# Patient Record
(demographics unavailable — no encounter records)

---

## 2025-01-08 NOTE — HISTORY OF PRESENT ILLNESS
[Patient reported mammogram was normal] : Patient reported mammogram was normal [Patient reported colonoscopy was normal] : Patient reported colonoscopy was normal [Y] : Positive pregnancy history [___] : #2 ([unfilled]): [Pregnancy History] : boy [Previously active] : previously active [Men] : men [FreeTextEntry1] : 66yo  postmenopausal female presents c/o pelvic pressure over the past few years, she denies any vaginal bleeding. The pain is non radiating, intermittent and mild and more of a crampy feeling she says. She has had no change in appetite nor any bloating or change in bowel habits. [Mammogramdate] : 6/2024 [PapSmeardate] : 1/7/2025 [ColonoscopyDate] : 10/2024 [PGHxTotal] : 2

## 2025-01-08 NOTE — PHYSICAL EXAM
[No Discharge] : no discharge [No Masses] : no breast masses were palpable [Labia Majora] : normal [Labia Minora] : normal [Atrophy] : atrophy [Normal] : normal [Uterine Adnexae] : non-palpable [Tenderness] : nontender

## 2025-04-22 NOTE — PLAN
[FreeTextEntry1] : Her benign results were reviewed along with OR pictures.  Though she never did experience any bleeding she was advised that if she does to let me know right away as no one passed can rule out cancer to 100%.

## 2025-05-06 NOTE — HISTORY OF PRESENT ILLNESS
[FreeTextEntry1] : Ms. ANDRE LIMA is a 68 year female who is seen in the office for diabetic foot care, onychomycosis, calluses, hammertoes. Patient denies any current or recent fever, chills, nausea, vomiting, SOB, or chest pain. AAOx3 and in NAD.

## 2025-05-06 NOTE — REASON FOR VISIT
[Initial Visit] : an initial visit for [FreeTextEntry2] : diabetic foot care, onychomycosis, calluses, hammertoes

## 2025-05-06 NOTE — PHYSICAL EXAM
[General Appearance - Alert] : alert [General Appearance - In No Acute Distress] : in no acute distress [Delayed in the Right Toes] : capillary refills normal in right toes [Delayed in the Left Toes] : capillary refills normal in the left toes [2+] : left foot dorsalis pedis 2+ [] : normal strength/tone [de-identified] : ROM of ankle, subtalar, midtarsal, MPJ, IPJ are adequate and nontender bilateral 5/5 muscle power in inversion, eversion, dorsiflexion, plantarflexion bilateral [Skin Turgor] : normal skin turgor [Foot Ulcer] : no foot ulcer [FreeTextEntry1] : gross epicritic sensation to feet diminished, light touch sensation diminished, sharp/dull sensation intact [Oriented To Time, Place, And Person] : oriented to person, place, and time [Impaired Insight] : insight and judgment were intact [Affect] : the affect was normal

## 2025-05-06 NOTE — ASSESSMENT
[FreeTextEntry1] : 1) Onychomycosis with elongated toenails with pain/discomfort: aseptic debridement of elongated mycotic toenails x 6, Rx tavaborole topical solution daily 2) Callus: aseptic debridement and paring of painful foot callus x 4, Rx urea 40% cream 3) Diabetes: continue efforts at glycemic control, check feet daily for any new lesions, use of proper fit shoes with accommodative insoles 4) Hammertoes: recommend use of proper fit high toe box shoes with accommodative insoles, check feet for any new lesions daily, seek treatment immediately if present, applied gel toe cap to protect hammertoe from untoward friction and pressure and to prevent ulcer formation, discussed surgical correction as well  Patient tolerated all treatments well and without any complications Follow up in 2 months [Verbal] : verbal [Patient] : patient [Good - alert, interested, motivated] : Good - alert, interested, motivated [Verbalizes knowledge/Understanding] : Verbalizes knowledge/understanding [Surgery] : surgery [Pt responsibility to plan of care] : patient responsibility to plan of care [Glycemic Control] : glycemic control

## 2025-05-06 NOTE — PROCEDURE
[FreeTextEntry1] : 1) Onychomycosis with elongated toenails with pain/discomfort: aseptic debridement of elongated mycotic toenails x 6, Rx tavaborole topical solution daily 2) Callus: aseptic debridement and paring of painful foot callus x 4  Patient tolerated all treatments well and without any complications